# Patient Record
Sex: FEMALE | Race: OTHER | NOT HISPANIC OR LATINO | Employment: FULL TIME | URBAN - METROPOLITAN AREA
[De-identification: names, ages, dates, MRNs, and addresses within clinical notes are randomized per-mention and may not be internally consistent; named-entity substitution may affect disease eponyms.]

---

## 2023-04-17 ENCOUNTER — OFFICE VISIT (OUTPATIENT)
Dept: OTOLARYNGOLOGY | Facility: CLINIC | Age: 78
End: 2023-04-17
Payer: MEDICARE

## 2023-04-17 VITALS — HEIGHT: 62 IN | WEIGHT: 156.06 LBS | BODY MASS INDEX: 28.72 KG/M2 | TEMPERATURE: 96 F

## 2023-04-17 DIAGNOSIS — C44.311 BASAL CELL CARCINOMA (BCC) OF RIGHT ALA NASI: Primary | ICD-10-CM

## 2023-04-17 PROCEDURE — 1101F PR PT FALLS ASSESS DOC 0-1 FALLS W/OUT INJ PAST YR: ICD-10-PCS | Mod: CPTII,S$GLB,, | Performed by: STUDENT IN AN ORGANIZED HEALTH CARE EDUCATION/TRAINING PROGRAM

## 2023-04-17 PROCEDURE — 1101F PT FALLS ASSESS-DOCD LE1/YR: CPT | Mod: CPTII,S$GLB,, | Performed by: STUDENT IN AN ORGANIZED HEALTH CARE EDUCATION/TRAINING PROGRAM

## 2023-04-17 PROCEDURE — 1126F PR PAIN SEVERITY QUANTIFIED, NO PAIN PRESENT: ICD-10-PCS | Mod: CPTII,S$GLB,, | Performed by: STUDENT IN AN ORGANIZED HEALTH CARE EDUCATION/TRAINING PROGRAM

## 2023-04-17 PROCEDURE — 3288F FALL RISK ASSESSMENT DOCD: CPT | Mod: CPTII,S$GLB,, | Performed by: STUDENT IN AN ORGANIZED HEALTH CARE EDUCATION/TRAINING PROGRAM

## 2023-04-17 PROCEDURE — 3288F PR FALLS RISK ASSESSMENT DOCUMENTED: ICD-10-PCS | Mod: CPTII,S$GLB,, | Performed by: STUDENT IN AN ORGANIZED HEALTH CARE EDUCATION/TRAINING PROGRAM

## 2023-04-17 PROCEDURE — 99999 PR PBB SHADOW E&M-NEW PATIENT-LVL III: CPT | Mod: PBBFAC,,, | Performed by: STUDENT IN AN ORGANIZED HEALTH CARE EDUCATION/TRAINING PROGRAM

## 2023-04-17 PROCEDURE — 1159F PR MEDICATION LIST DOCUMENTED IN MEDICAL RECORD: ICD-10-PCS | Mod: CPTII,S$GLB,, | Performed by: STUDENT IN AN ORGANIZED HEALTH CARE EDUCATION/TRAINING PROGRAM

## 2023-04-17 PROCEDURE — 99204 OFFICE O/P NEW MOD 45 MIN: CPT | Mod: S$GLB,,, | Performed by: STUDENT IN AN ORGANIZED HEALTH CARE EDUCATION/TRAINING PROGRAM

## 2023-04-17 PROCEDURE — 99204 PR OFFICE/OUTPT VISIT, NEW, LEVL IV, 45-59 MIN: ICD-10-PCS | Mod: S$GLB,,, | Performed by: STUDENT IN AN ORGANIZED HEALTH CARE EDUCATION/TRAINING PROGRAM

## 2023-04-17 PROCEDURE — 99999 PR PBB SHADOW E&M-NEW PATIENT-LVL III: ICD-10-PCS | Mod: PBBFAC,,, | Performed by: STUDENT IN AN ORGANIZED HEALTH CARE EDUCATION/TRAINING PROGRAM

## 2023-04-17 PROCEDURE — 1159F MED LIST DOCD IN RCRD: CPT | Mod: CPTII,S$GLB,, | Performed by: STUDENT IN AN ORGANIZED HEALTH CARE EDUCATION/TRAINING PROGRAM

## 2023-04-17 PROCEDURE — 1126F AMNT PAIN NOTED NONE PRSNT: CPT | Mod: CPTII,S$GLB,, | Performed by: STUDENT IN AN ORGANIZED HEALTH CARE EDUCATION/TRAINING PROGRAM

## 2023-04-17 RX ORDER — KETOCONAZOLE 20 MG/ML
SHAMPOO, SUSPENSION TOPICAL EVERY OTHER DAY
COMMUNITY
Start: 2023-03-13

## 2023-04-17 RX ORDER — METFORMIN HYDROCHLORIDE 500 MG/1
500 TABLET ORAL
COMMUNITY
Start: 2023-03-12

## 2023-04-17 RX ORDER — DIAZEPAM 5 MG/1
5 TABLET ORAL ONCE AS NEEDED
COMMUNITY
Start: 2023-04-04

## 2023-04-17 RX ORDER — APIXABAN 5 MG/1
5 TABLET, FILM COATED ORAL 2 TIMES DAILY
COMMUNITY
Start: 2023-04-08

## 2023-04-17 RX ORDER — BUMETANIDE 2 MG/1
2 TABLET ORAL DAILY PRN
COMMUNITY
Start: 2023-02-07

## 2023-04-17 RX ORDER — METOPROLOL SUCCINATE 100 MG/1
100 TABLET, EXTENDED RELEASE ORAL
COMMUNITY
Start: 2023-03-14

## 2023-04-17 RX ORDER — LOSARTAN POTASSIUM 50 MG/1
50 TABLET ORAL
COMMUNITY
Start: 2023-03-12

## 2023-04-17 RX ORDER — AZITHROMYCIN 250 MG/1
250 TABLET, FILM COATED ORAL
COMMUNITY
Start: 2022-12-14

## 2023-04-17 RX ORDER — IPRATROPIUM BROMIDE 42 UG/1
2 SPRAY, METERED NASAL 3 TIMES DAILY
COMMUNITY
Start: 2023-04-06

## 2023-04-17 RX ORDER — LEVOTHYROXINE SODIUM 88 UG/1
88 TABLET ORAL
COMMUNITY
Start: 2023-03-12

## 2023-04-17 NOTE — PROGRESS NOTES
CC:   Chief Complaint   Patient presents with    Other     MOHS consult        REFERRING PROVIDER:  Jayden Campos Md  [address] [place] [address] [place] [address]    HISTORY OF PRESENT ILLNESS: Nicolas Narayanan is a 77 y.o. female who presents for evaluation of anticipated Mohs defect.  History is significant for a-fib.     Dx with BCC of nasal dorsum.     prior skin cancer(s) of hands, none of face. No history of anesthetic complications or wound healing problems.  No history of personal or family bleeding/clotting disorders. She not smoke.       She is on eliquis (2 years). Has been able to stop for a few days. Sees cardiology clinic in [facility].    Has had surgery at OP facilities recently.       There is no problem list on file for this patient.          No past medical history on file.       No past surgical history on file.       Family History:   No family history of wound healing problems, bleeding disorders, or reactions to anesthesia.       Allergies:   Review of patient's allergies indicates:   Allergen Reactions    Penicillins     Tetracyclines            Medications:     Current Outpatient Medications:     azithromycin (Z-RANDA) 250 MG tablet, Take 250 mg by mouth., Disp: , Rfl:     bumetanide (BUMEX) 2 MG tablet, Take 2 mg by mouth daily as needed., Disp: , Rfl:     diazePAM (VALIUM) 5 MG tablet, Take 5 mg by mouth once as needed., Disp: , Rfl:     ELIQUIS 5 mg Tab, Take 5 mg by mouth 2 (two) times daily., Disp: , Rfl:     ipratropium (ATROVENT) 42 mcg (0.06 %) nasal spray, 2 sprays by Each Nostril route 3 (three) times daily., Disp: , Rfl:     ketoconazole (NIZORAL) 2 % shampoo, Apply topically every other day., Disp: , Rfl:     levothyroxine (SYNTHROID) 88 MCG tablet, Take 88 mcg by mouth., Disp: , Rfl:     losartan (COZAAR) 50 MG tablet, Take 50 mg by mouth., Disp: , Rfl:     metFORMIN (GLUCOPHAGE) 500 MG tablet, Take 500 mg by mouth., Disp: , Rfl:     metoprolol succinate (TOPROL-XL)  "100 MG 24 hr tablet, Take 100 mg by mouth., Disp: , Rfl:        Social History:   Social History     Socioeconomic History    Marital status: Other   Tobacco Use    Smoking status: Never    Smokeless tobacco: Never              PHYSICAL EXAM:    Temperature (!) 95.8 °F (35.4 °C), temperature source Temporal, height 5' 2" (1.575 m), weight 70.8 kg (156 lb 1.4 oz).       General Appearance: Pleasant, well-developed, well-nourished patient in no apparent distress. Mental status is normal.     Head and Face:   Face with symmetric motion, sensation normal. Bony facial skeleton intact.     Potential skin/soft tissue donor sites:     Forehead height:  good, horizontal scar of right forehead (lateral to midline)    Cheek/midface:  no scars, good mobility     Eyes: Extraocular muscles intact.     Ears: External ears normal to inspection and palpation.  No ear scars.  Hearing threshold grossly within normal limits.     Nose: small healing biopsy site right nasal-alar groove.  Grossly patent by anterior examination.  No external deformity.     Oral Cavity/Oropharynx: No masses or lesions of gums, tongue, floor of mouth, buccal mucosa, hard palate or soft palate. No tonsillar or oropharyngeal masses.  Posterior pharyngeal wall is normal.       Neck/Lymphatic: Soft and supple without adenopathy.Salivary glands normal.  Neck ROM is age-appropriate.     Neurologic: Cranial nerves II-XII otherwise grossly intact.  Gait:  normal.    Respiratory:  Breathing quietly, comfortably, no stridor or wheezing.     Cardiovascular:  No cyanosis.  No peripheral edema.     ASSESSMENT:      1. Basal cell carcinoma (BCC) of right ala nasi         PLAN:   We have discussed issues and options today.  I reviewed the anatomy relative to management of defects in this area.  We discussed the reconstructive ladder from skin grafts, to local tissue rearrangement, pedicles flaps, and possible need for ear cartilage grafting for nasal valve collapse. Final " reconstructive plan will depend on final defect. Rationale, limitations, expectations all discussed.  Risks, benefits and alternatives were discussed including risk of bleeding, infection, scar, numbness, donor site scar, asymmetry, contour deformity, functional impairment, need for additional procedures.  All questions answered.  We are ready to proceed. Will call with surgery dates in the near future.     Will plan for surgery after May 15 (granddaughter graduation)        Earl Oseguera MD  [facility]   [facility] - [address]  [place] [address]  P: [phone]  F: [phone]

## 2023-04-20 ENCOUNTER — TELEPHONE (OUTPATIENT)
Dept: OTOLARYNGOLOGY | Facility: CLINIC | Age: 78
End: 2023-04-20
Payer: MEDICARE

## 2023-04-20 DIAGNOSIS — Z01.818 PRE-OP TESTING: Primary | ICD-10-CM

## 2023-04-20 NOTE — TELEPHONE ENCOUNTER
----- Message from Earl Oseguera MD sent at 4/20/2023  3:18 PM CDT -----  Regarding: mohs surgery  Can you see if she would be able to do Mohs recon on May 24?  Andrse said he can do May 23 for his part.    Thanks!

## 2023-04-20 NOTE — TELEPHONE ENCOUNTER
Attempted to contact pt.  No answer, left voicemail.  Requested return call.  Will attempt recall tomorrow.

## 2023-04-21 NOTE — TELEPHONE ENCOUNTER
Called pt to discuss surgical date.  She was on her way to court and her  usually handles most of the appts/schedule so she was unable to discuss dates at the moment.  Advised her I would reach out again on our next clinic day, Monday.  She verbalized understanding and agreed with plan.

## 2023-04-25 NOTE — TELEPHONE ENCOUNTER
Attempted to contact pt.  No answer and unable to leave voicemail as it was full.  Will attempt call back at a later time.

## 2023-05-15 NOTE — TELEPHONE ENCOUNTER
Spoke with pt.  States she is not at home or her office.  Advised pt that Pavan Espinal has also been attempting to contact her regarding getting surgery coordinated.  Requested she call us back along with Pavan Espinal so we can set up surgical dates.  Pt verbalized understanding.